# Patient Record
Sex: MALE | Race: WHITE
[De-identification: names, ages, dates, MRNs, and addresses within clinical notes are randomized per-mention and may not be internally consistent; named-entity substitution may affect disease eponyms.]

---

## 2021-07-02 ENCOUNTER — HOSPITAL ENCOUNTER (EMERGENCY)
Dept: HOSPITAL 54 - ER | Age: 46
Discharge: HOME | End: 2021-07-02
Payer: MEDICAID

## 2021-07-02 VITALS — SYSTOLIC BLOOD PRESSURE: 137 MMHG | DIASTOLIC BLOOD PRESSURE: 80 MMHG

## 2021-07-02 VITALS — BODY MASS INDEX: 29.49 KG/M2 | HEIGHT: 70 IN | WEIGHT: 206 LBS

## 2021-07-02 DIAGNOSIS — Z60.2: ICD-10-CM

## 2021-07-02 DIAGNOSIS — M25.462: Primary | ICD-10-CM

## 2021-07-02 DIAGNOSIS — F17.200: ICD-10-CM

## 2021-07-02 PROCEDURE — 99284 EMERGENCY DEPT VISIT MOD MDM: CPT

## 2021-07-02 PROCEDURE — 20611 DRAIN/INJ JOINT/BURSA W/US: CPT

## 2021-07-02 PROCEDURE — 73564 X-RAY EXAM KNEE 4 OR MORE: CPT

## 2021-07-02 SDOH — SOCIAL STABILITY - SOCIAL INSECURITY: PROBLEMS RELATED TO LIVING ALONE: Z60.2

## 2021-07-02 NOTE — NUR
TO ER BED 11 FOR EVAL AND TX OF LEFT LE PAIN,CAME IN WITH CRUTCHES THAT WAS 
ISSUED TO HIM IN [facility] A MONTH AGO AFTER HIS L KNEE WAS DRAINED. 
CHANGED INTO A GOWN,READY FOR MD AWAN